# Patient Record
Sex: MALE | Race: WHITE | Employment: FULL TIME | URBAN - METROPOLITAN AREA
[De-identification: names, ages, dates, MRNs, and addresses within clinical notes are randomized per-mention and may not be internally consistent; named-entity substitution may affect disease eponyms.]

---

## 2023-06-28 ENCOUNTER — OFFICE VISIT (OUTPATIENT)
Dept: FAMILY MEDICINE | Facility: CLINIC | Age: 48
End: 2023-06-28

## 2023-06-28 VITALS — SYSTOLIC BLOOD PRESSURE: 173 MMHG | OXYGEN SATURATION: 97 % | DIASTOLIC BLOOD PRESSURE: 112 MMHG | HEART RATE: 80 BPM

## 2023-06-28 DIAGNOSIS — H69.92 DYSFUNCTION OF LEFT EUSTACHIAN TUBE: Primary | ICD-10-CM

## 2023-06-28 PROCEDURE — 99203 OFFICE O/P NEW LOW 30 MIN: CPT

## 2023-06-28 RX ORDER — AMLODIPINE BESYLATE 10 MG/1
10 TABLET ORAL DAILY
COMMUNITY

## 2023-06-28 RX ORDER — AMLODIPINE BESYLATE AND ATORVASTATIN CALCIUM 10; 10 MG/1; MG/1
1 TABLET, FILM COATED ORAL DAILY
COMMUNITY
End: 2023-06-28

## 2023-06-28 NOTE — PATIENT INSTRUCTIONS
Start using your nasal today, you can also use an oral antihistamine such as Zyrtec or Claritin daily.

## 2023-06-28 NOTE — PROGRESS NOTES
Assessment & Plan     Dysfunction of left eustachian tube  Symptoms most likely related to poor air quality and high pollen content in the air recently    12 minutes spent by me on the date of the encounter doing chart review, patient visit and documentation        See Patient Instructions    Return in about 1 week (around 7/5/2023), or if symptoms worsen or fail to improve.    [facility]  [facility]    Heaven Portillo is a 47 year old, presenting for the following health issues:  Urgent Care (Clogged ear, 14 hr flight tomorrow - )         No data to display              HPI     Presents with left ear fullness for the past 24 hours.  He is leaving for [place] tomorrow and wants to make sure his ears are okay.  No history of chronic ear infections or ear surgeries.  No recent colds or flus, no history of seasonal allergies.  No drainage from the ear      Review of Systems   Constitutional, HEENT, cardiovascular, pulmonary, gi and gu systems are negative, except as otherwise noted.      Objective    BP (!) 173/112   Pulse 80   SpO2 97%   There is no height or weight on file to calculate BMI.  Physical Exam   GENERAL: healthy, alert and no distress  EYES: Eyes grossly normal to inspection, PERRL and conjunctivae and sclerae normal  HENT: normal cephalic/atraumatic, ear canals and TM's normal, nose and mouth without ulcers or lesions, oropharynx clear and oral mucous membranes moist  NECK: no adenopathy  RESP: lungs clear to auscultation - no rales, rhonchi or wheezes